# Patient Record
Sex: MALE | Race: WHITE | URBAN - METROPOLITAN AREA
[De-identification: names, ages, dates, MRNs, and addresses within clinical notes are randomized per-mention and may not be internally consistent; named-entity substitution may affect disease eponyms.]

---

## 2018-03-09 ENCOUNTER — OP HISTORICAL/CONVERTED ENCOUNTER (OUTPATIENT)
Dept: OTHER | Age: 83
End: 2018-03-09

## 2018-04-01 ENCOUNTER — OP HISTORICAL/CONVERTED ENCOUNTER (OUTPATIENT)
Dept: OTHER | Age: 83
End: 2018-04-01

## 2018-06-09 ENCOUNTER — ED HISTORICAL/CONVERTED ENCOUNTER (OUTPATIENT)
Dept: OTHER | Age: 83
End: 2018-06-09

## 2020-02-25 ENCOUNTER — IP HISTORICAL/CONVERTED ENCOUNTER (OUTPATIENT)
Dept: OTHER | Age: 85
End: 2020-02-25

## 2020-03-26 ENCOUNTER — IP HISTORICAL/CONVERTED ENCOUNTER (OUTPATIENT)
Dept: OTHER | Age: 85
End: 2020-03-26

## 2020-07-24 ENCOUNTER — TELEPHONE (OUTPATIENT)
Dept: PRIMARY CARE CLINIC | Age: 85
End: 2020-07-24

## 2020-07-24 RX ORDER — CIPROFLOXACIN 500 MG/1
500 TABLET ORAL EVERY 12 HOURS
Qty: 20 TAB | Refills: 0 | Status: SHIPPED | OUTPATIENT
Start: 2020-07-24 | End: 2020-08-25 | Stop reason: SDUPTHER

## 2020-07-24 RX ORDER — CIPROFLOXACIN 500 MG/1
500 TABLET ORAL EVERY 12 HOURS
COMMUNITY
End: 2020-07-24 | Stop reason: SDUPTHER

## 2020-07-24 NOTE — TELEPHONE ENCOUNTER
Saintclair Sawyer called and said he needs a antibiotic bc his UA came back poss.  With WBC and Protien

## 2020-07-24 NOTE — TELEPHONE ENCOUNTER
Spoke with Maria M from [facility]. She states it's positive for salmonella.  She is faxing the culture results

## 2020-07-30 RX ORDER — FLUDROCORTISONE ACETATE 0.1 MG/1
TABLET ORAL
Qty: 15 TAB | OUTPATIENT
Start: 2020-07-30

## 2020-08-21 ENCOUNTER — TELEPHONE (OUTPATIENT)
Dept: PRIMARY CARE CLINIC | Age: 85
End: 2020-08-21

## 2020-08-21 NOTE — TELEPHONE ENCOUNTER
Sheryl Acosta from [facility] called and stated that last urine collected had IWBC'S in it and the family is concerned about possible UTI and wether or not he should be on antibiotics or not? Sheryl Acosta also stated that she was unable to get urine collected on him this week because he said that he felt to shaky to pee in a cup for her.

## 2020-08-21 NOTE — TELEPHONE ENCOUNTER
[facility] called for results of urine culture. Home Health nurse told her she looked up labs and the patient has an infection.

## 2020-08-24 NOTE — TELEPHONE ENCOUNTER
Patients Caretaker Enrike Dennis called stating Patient just seen Dr Nolan Schulte last week and was told to stay on Cipro for 1 month. Pharmacy [facility] in [place].

## 2020-08-25 RX ORDER — CIPROFLOXACIN 500 MG/1
500 TABLET ORAL EVERY 12 HOURS
Qty: 20 TAB | Refills: 0 | Status: SHIPPED | OUTPATIENT
Start: 2020-08-25 | End: 2020-10-25 | Stop reason: SDUPTHER

## 2020-08-26 ENCOUNTER — TELEPHONE (OUTPATIENT)
Dept: PRIMARY CARE CLINIC | Age: 85
End: 2020-08-26

## 2020-08-26 NOTE — TELEPHONE ENCOUNTER
CX  Grew salmonella, sensitive to cipro. 10 days supply sent. Reculture  On  Next Monday. 31st.   Do I not have a standing order for weekly culture?   They DO Not need to do the urinalysis

## 2020-10-02 ENCOUNTER — TELEPHONE (OUTPATIENT)
Dept: PRIMARY CARE CLINIC | Age: 85
End: 2020-10-02

## 2020-10-02 NOTE — TELEPHONE ENCOUNTER
Ciro Antunez called from [facility] as an Saundra Ruder and also to let us know that she was sending over an order that would need to be signed as well for neosprine gauze because patient fell on Monday and has lacerations on his arms that will need to be dressed and bandaged with them.

## 2020-10-15 ENCOUNTER — TELEPHONE (OUTPATIENT)
Dept: PRIMARY CARE CLINIC | Age: 85
End: 2020-10-15

## 2020-10-15 NOTE — TELEPHONE ENCOUNTER
Called about needing a urine culture.  Peggy Power informed them that he should have orders for weekly cultures

## 2020-10-15 NOTE — TELEPHONE ENCOUNTER
alyx from [id number] [address] asked if someone can give her a call regarding this pt.  She didn't give any other info her number is [phone]

## 2020-10-20 ENCOUNTER — TELEPHONE (OUTPATIENT)
Dept: PRIMARY CARE CLINIC | Age: 85
End: 2020-10-20

## 2020-10-20 RX ORDER — LEVOTHYROXINE SODIUM 50 UG/1
TABLET ORAL
Qty: 60 TAB | Refills: 0 | Status: SHIPPED | OUTPATIENT
Start: 2020-10-20

## 2020-10-22 ENCOUNTER — TELEPHONE (OUTPATIENT)
Dept: PRIMARY CARE CLINIC | Age: 85
End: 2020-10-22

## 2020-10-22 DIAGNOSIS — N39.0 URINARY TRACT INFECTION ASSOCIATED WITH INDWELLING URETHRAL CATHETER, SUBSEQUENT ENCOUNTER: Primary | Chronic | ICD-10-CM

## 2020-10-22 DIAGNOSIS — T83.511D URINARY TRACT INFECTION ASSOCIATED WITH INDWELLING URETHRAL CATHETER, SUBSEQUENT ENCOUNTER: Primary | Chronic | ICD-10-CM

## 2020-10-25 RX ORDER — CIPROFLOXACIN 500 MG/1
500 TABLET ORAL EVERY 12 HOURS
Qty: 28 TAB | Refills: 0 | Status: SHIPPED | OUTPATIENT
Start: 2020-10-25 | End: 2020-11-08

## 2020-10-26 NOTE — TELEPHONE ENCOUNTER
Still growing salmonella. Still sensitive to cipro. Escribed. reculture in 7 days. He may be populated.    How often is he seeing his urologist?

## 2020-11-04 NOTE — PROGRESS NOTES
Still growing Salmonella. Tell home health nurse to refer him to infectious disease doctor please. There is one at [facility].

## 2020-12-09 RX ORDER — FINASTERIDE 5 MG/1
TABLET, FILM COATED ORAL
Qty: 30 TAB | Refills: 0 | Status: SHIPPED | OUTPATIENT
Start: 2020-12-09

## 2020-12-26 RX ORDER — GABAPENTIN 300 MG/1
CAPSULE ORAL
Qty: 120 CAP | OUTPATIENT
Start: 2020-12-26

## 2020-12-28 NOTE — TELEPHONE ENCOUNTER
S/w Patients caregiver, she stated that Patient changed PCP and she will let the pharmacy know to send meds refills to the new

## 2023-05-21 RX ORDER — LEVOTHYROXINE SODIUM 0.05 MG/1
1 TABLET ORAL DAILY
COMMUNITY
Start: 2020-10-20

## 2023-05-21 RX ORDER — FINASTERIDE 5 MG/1
1 TABLET, FILM COATED ORAL NIGHTLY
COMMUNITY
Start: 2020-12-09